# Patient Record
Sex: MALE | Race: AMERICAN INDIAN OR ALASKA NATIVE
[De-identification: names, ages, dates, MRNs, and addresses within clinical notes are randomized per-mention and may not be internally consistent; named-entity substitution may affect disease eponyms.]

---

## 2022-07-21 ENCOUNTER — HOSPITAL ENCOUNTER (EMERGENCY)
Dept: HOSPITAL 5 - ED | Age: 57
Discharge: LEFT BEFORE DISCHARGE | End: 2022-07-21
Payer: MEDICARE

## 2022-07-21 VITALS — SYSTOLIC BLOOD PRESSURE: 177 MMHG | DIASTOLIC BLOOD PRESSURE: 97 MMHG

## 2022-07-21 DIAGNOSIS — E11.22: ICD-10-CM
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
## 2022-07-21 DIAGNOSIS — R06.00: ICD-10-CM
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
## 2022-07-21 DIAGNOSIS — S00.81XA: Primary | ICD-10-CM
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
## 2022-07-21 LAB
ALBUMIN SERPL-MCNC: 3.6 G/DL (ref 3.9–5)
ALT SERPL-CCNC: 17 UNITS/L (ref 7–56)
ANISOCYTOSIS BLD QL SMEAR: (no result)
APTT BLD: 35.9 SEC. (ref 24.2–36.6)
BAND NEUTROPHILS # (MANUAL): 0 K/MM3
BUN SERPL-MCNC: 41 MG/DL (ref 9–20)
BUN/CREAT SERPL: 8 %
CALCIUM SERPL-MCNC: 9 MG/DL (ref 8.4–10.2)
HCT VFR BLD CALC: 35.4 % (ref 35.5–45.6)
HEMOLYSIS INDEX: 9
HGB BLD-MCNC: 11.4 GM/DL (ref 11.8–15.2)
INR PPP: 1 (ref 0.87–1.13)
MCHC RBC AUTO-ENTMCNC: 32 % (ref 32–34)
MCV RBC AUTO: 95 FL (ref 84–94)
MYELOCYTES # (MANUAL): 0 K/MM3
PLATELET # BLD: 414 K/MM3 (ref 140–440)
PROMYELOCYTES # (MANUAL): 0 K/MM3
RBC # BLD AUTO: 3.73 M/MM3 (ref 3.65–5.03)
TOTAL CELLS COUNTED BLD: 100

## 2022-07-21 PROCEDURE — 84484 ASSAY OF TROPONIN QUANT: CPT

## 2022-07-21 PROCEDURE — 93005 ELECTROCARDIOGRAM TRACING: CPT

## 2022-07-21 PROCEDURE — 80053 COMPREHEN METABOLIC PANEL: CPT

## 2022-07-21 PROCEDURE — 85730 THROMBOPLASTIN TIME PARTIAL: CPT

## 2022-07-21 PROCEDURE — 96374 THER/PROPH/DIAG INJ IV PUSH: CPT

## 2022-07-21 PROCEDURE — 85007 BL SMEAR W/DIFF WBC COUNT: CPT

## 2022-07-21 PROCEDURE — 99284 EMERGENCY DEPT VISIT MOD MDM: CPT

## 2022-07-21 PROCEDURE — 36415 COLL VENOUS BLD VENIPUNCTURE: CPT

## 2022-07-21 PROCEDURE — 71046 X-RAY EXAM CHEST 2 VIEWS: CPT

## 2022-07-21 PROCEDURE — 85025 COMPLETE CBC W/AUTO DIFF WBC: CPT

## 2022-07-21 PROCEDURE — 85610 PROTHROMBIN TIME: CPT

## 2022-07-21 NOTE — ELECTROCARDIOGRAPH REPORT
[facility]

                                       

Test Date:    2022               Test Time:    10:02:42

Pat Name:     TONY MACKEY               Department:   

Patient ID:   [id number]          Room:          

Gender:       M                        Technician:   MARIANO

:          1965               Requested By: KRYSTLE GARDNER

Order Number: [id number]              Reading MD:   Viola Smith

                                 Measurements

Intervals                              Axis          

Rate:         112                      P:            101

MI:           131                      QRS:          -72

QRSD:         103                      T:            86

QT:           377                                    

QTc:          515                                    

                           Interpretive Statements

Sinus tachycardia

Probable left atrial enlargement

Left anterior fascicular block

Left ventricular hypertrophy

Abnormal T, consider ischemia, lateral leads

Prolonged QT interval

No previous ECG available for comparison

Electronically Signed On 2022 13:52:44 EDT by Viola Smith

## 2022-07-21 NOTE — XRAY REPORT
XR chest routine 2V



INDICATION / CLINICAL INFORMATION:

Chest Pain. 



COMPARISON: 

None available.



FINDINGS:



SUPPORT DEVICES: Internal jugular central venous access catheter terminates in the cavoatrial junctio
n.



HEART / MEDIASTINUM: Unchanged. 



LUNGS / PLEURA: There is interlobular septal thickening and overall diffuse thickening of the septa. 
Nonspecific nodular opacities seen along the lateral anterior fourth rib, left lung apex, and right m
edial lung zone. No pneumothorax. Costophrenic sulci are sharp



ADDITIONAL FINDINGS: No significant additional findings.



IMPRESSION:

1. Diffuse interstitial thickening which is more widespread than generally expected for interstitial 
edema. Additionally, there are possible lung nodules. Although these findings are nonspecific it rais
es the possibility of lymphangitic carcinomatosis in the correct setting.  Correlate clinically and r
ecommend CT chest for further evaluation.



Signer Name: Giorgio Leung MD 

Signed: 7/21/2022 12:28 PM

Workstation Name: [id number]

## 2022-07-21 NOTE — EMERGENCY DEPARTMENT REPORT
ED Shortness of Breath HPI





- General


Chief Complaint: Dyspnea/Respdistress


Stated Complaint: VIC/MISSED DIALYSIS


Time Seen by Provider: 07/21/22 10:17


Source: patient, EMS


Mode of arrival: Stretcher


Limitations: No Limitations





- History of Present Illness


Initial Comments: 





57-year-old male with a history of diabetes and end-stage renal disease 

currently on dialysis Monday, Wednesday, and Friday who presented with shortness

of breath that started couple of days ago progressively getting worse.  Patient 

also reports dizziness and fall with injury to his forehead last night.  Patient

denies any fever or chills.  No cough or palpitation reported. No other 

modifying or associated factors reported. 


MD Complaint: shortness of breath





- Related Data


                                    Allergies











Allergy/AdvReac Type Severity Reaction Status Date / Time


 


No Known Allergies Allergy   Unverified 07/21/22 09:39














ED Review of Systems


ROS: 


Stated complaint: VIC/MISSED DIALYSIS


Other details as noted in HPI





Comment: All other systems reviewed and negative


Respiratory: shortness of breath, SOB with exertion


Musculoskeletal: other (injury to forehead)





ED Past Medical Hx





- Social History


Smoking Status: Never Smoker





ED Physical Exam





- General


Limitations: No Limitations


General appearance: alert, in no apparent distress





- Head


Head exam: Present: other (abrasion to forehead)





- Eye


Eye exam: Present: normal appearance


Pupils: Present: normal accommodation





- ENT


ENT exam: Present: normal exam, normal orophraynx, mucous membranes moist





- Neck


Neck exam: Present: normal inspection, full ROM.  Absent: tenderness





- Respiratory


Respiratory exam: Present: normal lung sounds bilaterally.  Absent: respiratory 

distress, accessory muscle use





- Cardiovascular


Cardiovascular Exam: Present: regular rate, normal rhythm, normal heart sounds





- GI/Abdominal


GI/Abdominal exam: Present: soft, normal bowel sounds.  Absent: distended, 

tenderness





- Extremities Exam


Extremities exam: Present: normal inspection, normal capillary refill.  Absent: 

full ROM, tenderness, pedal edema





- Back Exam


Back exam: Absent: tenderness





- Neurological Exam


Neurological exam: Present: alert, oriented X3





- Psychiatric


Psychiatric exam: Present: normal affect





- Skin


Skin exam: Present: warm





ED Course


                                   Vital Signs











  07/21/22 07/21/22 07/21/22





  09:25 10:01 10:15


 


Temperature 98.3 F  


 


Pulse Rate 119 H 112 H 110 H


 


Respiratory 24 23 23





Rate   


 


Blood Pressure   


 


Blood Pressure 155/84  





[Left]   


 


O2 Sat by Pulse 98 100 100





Oximetry   














  07/21/22 07/21/22 07/21/22





  10:28 10:31 10:45


 


Temperature   


 


Pulse Rate  113 H 120 H


 


Respiratory  31 H 26 H





Rate   


 


Blood Pressure  136/76 136/76


 


Blood Pressure   





[Left]   


 


O2 Sat by Pulse 96 95 95





Oximetry   














  07/21/22 07/21/22 07/21/22





  11:01 11:15 11:31


 


Temperature   


 


Pulse Rate 111 H 114 H 114 H


 


Respiratory 22 23 17





Rate   


 


Blood Pressure 158/74 158/74 156/83


 


Blood Pressure   





[Left]   


 


O2 Sat by Pulse 93 87 84





Oximetry   














  07/21/22 07/21/22 07/21/22





  11:46 12:01 12:15


 


Temperature   


 


Pulse Rate 112 H 113 H 116 H


 


Respiratory 24 18 24





Rate   


 


Blood Pressure 156/83 156/83 156/83


 


Blood Pressure   





[Left]   


 


O2 Sat by Pulse 85 94 90





Oximetry   














  07/21/22





  12:31


 


Temperature 


 


Pulse Rate 114 H


 


Respiratory 26 H





Rate 


 


Blood Pressure 177/97


 


Blood Pressure 





[Left] 


 


O2 Sat by Pulse 90





Oximetry 














- Consultations


Consultation #1: 





07/21/22 14:49


Dr Bardales pageulises by the unit clerk but with no response until patient decided to 

sign out against medical advise at 14:50 PM-- 





ED Medical Decision Making





- Lab Data


Result diagrams: 


                                 07/21/22 11:27





                                 07/21/22 11:27





- EKG Data


-: EKG Interpreted by Me


EKG shows normal: sinus rhythm


Rate: tachycardia





- EKG Data





07/21/22 12:38


Sinus tachycardia at a rate of 112 bpm with left ventricular hypertrophy and 

nonspecific T wave abnormality in this abnormal ECG.





- Medical Decision Making





here with dyspnea but missed his dialysis yesterday -- this could be as a result

 of fluid overload from his renal failure-- but could not rule out infections 

such as pneumonia, uti or an electrolytes abnormality--so to rule out those will

 order CBC, CMP and UA for further evaluation and treatment. Pt also has abras

ion to his forehead from a fall and reports been on blood thinner so CT head 

will be order to rule out any intracranial bleeding-- 





CXR noted with 


FINDINGS:  


 


 SUPPORT DEVICES: Internal jugular central venous access catheter terminates in 

the cavoatrial 


junction.  


 


 HEART / MEDIASTINUM: Unchanged.   


 


 LUNGS / PLEURA: There is interlobular septal thickening and overall diffuse 

thickening of the 


septa. Nonspecific nodular opacities seen along the lateral anterior fourth rib,

 left lung apex, and


right medial lung zone. No pneumothorax. Costophrenic sulci are sharp  


 


 ADDITIONAL FINDINGS: No significant additional findings.  


 


 IMPRESSION:  


 1. Diffuse interstitial thickening which is more widespread than generally 

expected for 


interstitial edema. Additionally, there are possible lung nodules. Although 

these findings are 


nonspecific it raises the possibility of lymphangitic carcinomatosis in the 

correct setting.  


Correlate clinically and recommend CT chest for further evaluation.  





-- Other labs reviewed and noted with elevated BUN/Cr 41/5.2 with K+ 5.8 with 

elevated troponin likely non cardiac but from his renal failure--pt will need 

dialysis -- 





Also noted with blood sugar 29 patient asked to be fed and given D50 --with 

hypoglycemia -- will continue to monitor





At around 14:45 patient sold her bedside nurse that he was going to sign out AMA

 and i use this opportunity to discuss with him the important of having him 

dialyzed today to help his symptoms but he insisted and will not be persuaded 

and left AMA. 


Critical care attestation.: 


If time is entered above; I have spent that time in minutes in the direct care 

of this critically ill patient, excluding procedure time.








ED Disposition


Clinical Impression: 


 ESRD (end stage renal disease) on dialysis, Hypoglycemia associated with type 2

 diabetes mellitus





Forehead abrasion


Qualifiers:


 Encounter type: initial encounter Qualified Code(s): S00.81XA - Abrasion of o

ther part of head, initial encounter





Dyspnea


Qualifiers:


 Dyspnea type: unspecified Qualified Code(s): R06.00 - Dyspnea, unspecified





Fall


Qualifiers:


 Encounter type: initial encounter Qualified Code(s): W19.XXXA - Unspecified 

fall, initial encounter





Pulmonary edema


Qualifiers:


 Chronicity: chronic Qualified Code(s): J81.1 - Chronic pulmonary edema





Disposition: 07 LEFT AGAINST MEDICAL ADVICE


Is pt being admited?: No


Does the pt Need Aspirin: No


Condition: Stable


Instructions:  Pulmonary Edema (ED), Diabetes Mellitus Type 2 in Adults (ED)


Additional Instructions: 


Even though you have signed out AGAINST MEDICAL ADVICE please do not hesitate to

 call or return to emergency room if your symptoms worsen you will be r

eevaluated and treated appropriately.


Referrals: 


PRIMARY CARE,MD [Primary Care Provider] - 3-5 Days


Forms:  AMA Form


Time of Disposition: 14:52